# Patient Record
Sex: FEMALE | Race: BLACK OR AFRICAN AMERICAN | Employment: FULL TIME | URBAN - METROPOLITAN AREA
[De-identification: names, ages, dates, MRNs, and addresses within clinical notes are randomized per-mention and may not be internally consistent; named-entity substitution may affect disease eponyms.]

---
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
## 2025-05-24 ENCOUNTER — HOSPITAL ENCOUNTER (EMERGENCY)
Facility: HOSPITAL | Age: 20
Discharge: HOME | End: 2025-05-24
Attending: EMERGENCY MEDICINE
Payer: COMMERCIAL

## 2025-05-24 ENCOUNTER — APPOINTMENT (OUTPATIENT)
Facility: HOSPITAL | Age: 20
End: 2025-05-24
Payer: COMMERCIAL

## 2025-05-24 VITALS
BODY MASS INDEX: 41.02 KG/M2 | OXYGEN SATURATION: 99 % | HEART RATE: 70 BPM | SYSTOLIC BLOOD PRESSURE: 118 MMHG | HEIGHT: 71 IN | RESPIRATION RATE: 20 BRPM | WEIGHT: 293 LBS | DIASTOLIC BLOOD PRESSURE: 70 MMHG | TEMPERATURE: 98.2 F

## 2025-05-24 DIAGNOSIS — O03.9 COMPLETE MISCARRIAGE: Primary | ICD-10-CM

## 2025-05-24 LAB
ANION GAP SERPL CALC-SCNC: 5 MMOL/L (ref 2–12)
APPEARANCE UR: ABNORMAL
BACTERIA URNS QL MICRO: NEGATIVE /HPF
BASOPHILS # BLD: 0.08 K/UL (ref 0–0.1)
BASOPHILS NFR BLD: 1 % (ref 0–1)
BILIRUB UR QL: NEGATIVE
BUN SERPL-MCNC: 7 MG/DL (ref 6–20)
BUN/CREAT SERPL: 8 (ref 12–20)
CALCIUM SERPL-MCNC: 9.5 MG/DL (ref 8.5–10.1)
CHLORIDE SERPL-SCNC: 108 MMOL/L (ref 97–108)
CO2 SERPL-SCNC: 24 MMOL/L (ref 21–32)
COLOR UR: YELLOW
CREAT SERPL-MCNC: 0.85 MG/DL (ref 0.55–1.02)
DIFFERENTIAL METHOD BLD: NORMAL
EOSINOPHIL # BLD: 0.39 K/UL (ref 0–0.4)
EOSINOPHIL NFR BLD: 5.1 % (ref 0–7)
EPITH CASTS URNS QL MICRO: ABNORMAL /LPF
ERYTHROCYTE [DISTWIDTH] IN BLOOD BY AUTOMATED COUNT: 12.7 % (ref 11.5–14.5)
GLUCOSE SERPL-MCNC: 91 MG/DL (ref 65–100)
GLUCOSE UR STRIP.AUTO-MCNC: NEGATIVE MG/DL
HCG SERPL QL: NEGATIVE
HCG UR QL: NEGATIVE
HCT VFR BLD AUTO: 41.4 % (ref 35–47)
HGB BLD-MCNC: 13.6 G/DL (ref 11.5–16)
HGB UR QL STRIP: ABNORMAL
HYALINE CASTS URNS QL MICRO: ABNORMAL /LPF (ref 0–2)
IMM GRANULOCYTES # BLD AUTO: 0.03 K/UL (ref 0–0.04)
IMM GRANULOCYTES NFR BLD AUTO: 0.4 % (ref 0–0.5)
KETONES UR QL STRIP.AUTO: ABNORMAL MG/DL
LEUKOCYTE ESTERASE UR QL STRIP.AUTO: ABNORMAL
LYMPHOCYTES # BLD: 2.47 K/UL (ref 0.8–3.5)
LYMPHOCYTES NFR BLD: 32.4 % (ref 12–49)
MCH RBC QN AUTO: 28 PG (ref 26–34)
MCHC RBC AUTO-ENTMCNC: 32.9 G/DL (ref 30–36.5)
MCV RBC AUTO: 85.2 FL (ref 80–99)
MONOCYTES # BLD: 0.62 K/UL (ref 0–1)
MONOCYTES NFR BLD: 8.1 % (ref 5–13)
NEUTS SEG # BLD: 4.03 K/UL (ref 1.8–8)
NEUTS SEG NFR BLD: 53 % (ref 32–75)
NITRITE UR QL STRIP.AUTO: NEGATIVE
NRBC # BLD: 0 K/UL (ref 0–0.01)
NRBC BLD-RTO: 0 PER 100 WBC
PH UR STRIP: 5.5 (ref 5–8)
PLATELET # BLD AUTO: 277 K/UL (ref 150–400)
PMV BLD AUTO: 9.6 FL (ref 8.9–12.9)
POTASSIUM SERPL-SCNC: 3.5 MMOL/L (ref 3.5–5.1)
PROT UR STRIP-MCNC: 30 MG/DL
RBC # BLD AUTO: 4.86 M/UL (ref 3.8–5.2)
RBC #/AREA URNS HPF: >100 /HPF (ref 0–5)
SODIUM SERPL-SCNC: 137 MMOL/L (ref 136–145)
SP GR UR REFRACTOMETRY: 1.03
URINE CULTURE IF INDICATED: ABNORMAL
UROBILINOGEN UR QL STRIP.AUTO: 1 EU/DL (ref 0.2–1)
WBC # BLD AUTO: 7.6 K/UL (ref 3.6–11)
WBC URNS QL MICRO: ABNORMAL /HPF (ref 0–4)

## 2025-05-24 PROCEDURE — 85025 COMPLETE CBC W/AUTO DIFF WBC: CPT

## 2025-05-24 PROCEDURE — 81025 URINE PREGNANCY TEST: CPT

## 2025-05-24 PROCEDURE — 36415 COLL VENOUS BLD VENIPUNCTURE: CPT

## 2025-05-24 PROCEDURE — 84703 CHORIONIC GONADOTROPIN ASSAY: CPT

## 2025-05-24 PROCEDURE — 80048 BASIC METABOLIC PNL TOTAL CA: CPT

## 2025-05-24 PROCEDURE — 99284 EMERGENCY DEPT VISIT MOD MDM: CPT

## 2025-05-24 PROCEDURE — 81001 URINALYSIS AUTO W/SCOPE: CPT

## 2025-05-24 PROCEDURE — 76857 US EXAM PELVIC LIMITED: CPT

## 2025-05-24 ASSESSMENT — PAIN DESCRIPTION - DESCRIPTORS: DESCRIPTORS: CRAMPING

## 2025-05-24 ASSESSMENT — PAIN SCALES - GENERAL: PAINLEVEL_OUTOF10: 5

## 2025-05-24 ASSESSMENT — PAIN - FUNCTIONAL ASSESSMENT
PAIN_FUNCTIONAL_ASSESSMENT: 0-10
PAIN_FUNCTIONAL_ASSESSMENT: ACTIVITIES ARE NOT PREVENTED

## 2025-05-24 ASSESSMENT — PAIN DESCRIPTION - LOCATION: LOCATION: ABDOMEN

## 2025-05-24 ASSESSMENT — PAIN DESCRIPTION - ORIENTATION: ORIENTATION: LOWER

## 2025-05-24 NOTE — ED PROVIDER NOTES
[facility] EMERGENCY DEPARTMENT  EMERGENCY DEPARTMENT ENCOUNTER       Pt Name: Jeff Cottrell  MRN: [id number]  Birthdate 2005  Date of evaluation: 5/24/2025  Provider: Los Bermudez MD   PCP: No primary care provider on file.  Note Started: 5:13 PM 5/24/25     CHIEF COMPLAINT       Chief Complaint   Patient presents with    Vaginal Bleeding     Ambulatory with cc of vaginal bleeding x2 days that has progressively gotten worse this AM with BRB and clots. Pt states G1, had a positive pregnancy test last week and has not seen OB yet. Pt also endorses lower abdominal cramping associated with bleeding. LMP 4/10/25         HISTORY OF PRESENT ILLNESS: 1 or more elements      History From: patient, History limited by: No limitations     Jeff Cottrell is a 19 y.o. female without significant medical history who presents with chief complaint of vaginal bleeding, concern for miscarriage.  Patient LMP 4/10/2025.  No prior pregnancies.  She took a positive pregnancy test a week ago at home.  Over the past 2 to 3 days she has had vaginal bleeding, cramping in the suprapubic region as well as her low back and passing large clots.  She states is very unusual for her.       Nursing Notes were all reviewed and agreed with or any disagreements were addressed in the HPI.     REVIEW OF SYSTEMS        Positives and Pertinent negatives as per HPI.    PAST HISTORY     Past Medical History:  No past medical history on file.    Past Surgical History:  No past surgical history on file.    Family History:  No family history on file.    Social History:       Allergies:  No Known Allergies    CURRENT MEDICATIONS      There are no discharge medications for this patient.      SCREENINGS               No data recorded         PHYSICAL EXAM      ED Triage Vitals [05/24/25 0955]   BP Systolic BP Percentile Diastolic BP Percentile Temp Temp Source Pulse Respirations SpO2   134/82 -- -- 98.2 °F (36.8 °C) Oral 70 20 100 %      Height Weight  - Scale         1.803 m (5' 11\") (!) 154.4 kg (340 lb 6.2 oz)              Physical Exam  Vitals and nursing note reviewed.   Constitutional:       General: She is not in acute distress.     Appearance: Normal appearance. She is obese. She is not ill-appearing.   Cardiovascular:      Rate and Rhythm: Normal rate and regular rhythm.      Heart sounds: No murmur heard.  Pulmonary:      Effort: Pulmonary effort is normal. No respiratory distress.      Breath sounds: Normal breath sounds.   Abdominal:      General: Abdomen is flat. There is no distension.      Palpations: Abdomen is soft.      Tenderness: There is abdominal tenderness. There is guarding. There is no rebound.      Hernia: No hernia is present.   Genitourinary:     Comments: Deferred by patient  Neurological:      General: No focal deficit present.      Mental Status: She is alert and oriented to person, place, and time.          DIAGNOSTIC RESULTS   LABS:     Labs Reviewed   URINALYSIS WITH REFLEX TO CULTURE - Abnormal; Notable for the following components:       Result Value    Appearance CLOUDY (*)     Protein, UA 30 (*)     Ketones, Urine TRACE (*)     Blood, Urine LARGE (*)     Leukocyte Esterase, Urine SMALL (*)     RBC, UA >100 (*)     Epithelial Cells, UA MANY (*)     All other components within normal limits   BASIC METABOLIC PANEL - Abnormal; Notable for the following components:    BUN/Creatinine Ratio 8 (*)     All other components within normal limits   CBC WITH AUTO DIFFERENTIAL   HCG, SERUM, QUALITATIVE   POC PREGNANCY UR-QUAL          EKG: If performed, independent interpretation documented below in the MDM section     RADIOLOGY:  Non-plain film images such as CT, Ultrasound and MRI are read by the radiologist. Plain radiographic images are visualized and preliminarily interpreted by the ED Provider with the findings documented in the MDM section.     Interpretation per the Radiologist below, if available at the time of this note:

## 2025-05-24 NOTE — DISCHARGE INSTRUCTIONS
You were evaluated in the emergency department for miscarriage.  Your examination was reassuring as was your work-up including lab work, urinalysis, and ultrasound.  It will be important for you to follow-up with your OB/Gyn in 5-7 days.  If you develop worsening symptoms such as worsening bleeding or fevers, please return to the emergency department immediately.     Latest Reference Range & Units 05/24/25 10:05 05/24/25 10:08   Sodium 136 - 145 mmol/L  137   Potassium 3.5 - 5.1 mmol/L  3.5   Chloride 97 - 108 mmol/L  108   CARBON DIOXIDE 21 - 32 mmol/L  24   BUN,BUNPL 6 - 20 MG/DL  7   Creatinine 0.55 - 1.02 MG/DL  0.85   Bun/Cre 12 - 20    8 (L)   Anion Gap 2 - 12 mmol/L  5   Est, Glom Filt Rate >60 ml/min/1.73m2  >90   Glucose 65 - 100 mg/dL  91   Calcium 8.5 - 10.1 MG/DL  9.5   Preg, Serum NEG    Negative   pH, Urine 5.0 - 8.0   5.5    WBC 3.6 - 11.0 K/uL  7.6   RBC 3.80 - 5.20 M/uL  4.86   Hemoglobin Quant 11.5 - 16.0 g/dL  13.6   Hematocrit 35.0 - 47.0 %  41.4   MCV 80.0 - 99.0 FL  85.2   MCH 26.0 - 34.0 PG  28.0   MCHC 30.0 - 36.5 g/dL  32.9   MPV 8.9 - 12.9 FL  9.6   RDW 11.5 - 14.5 %  12.7   Platelet Count 150 - 400 K/uL  277   Neutrophils % 32.0 - 75.0 %  53.0   Lymphocyte % 12.0 - 49.0 %  32.4   Monocytes % 5.0 - 13.0 %  8.1   Eosinophils % 0.0 - 7.0 %  5.1   Basophils % 0.0 - 1.0 %  1.0   Neutrophils Absolute 1.80 - 8.00 K/UL  4.03   Lymphocytes Absolute 0.80 - 3.50 K/UL  2.47   Monocytes Absolute 0.00 - 1.00 K/UL  0.62   Eosinophils Absolute 0.00 - 0.40 K/UL  0.39   Basophils Absolute 0.00 - 0.10 K/UL  0.08   Differential Type -    AUTOMATED   Immature Granulocytes % 0.0 - 0.5 %  0.4   Nucleated Red Blood Cells 0  WBC  0.00 - 0.01 K/uL  0.0  0.00   Immature Granulocytes Absolute 0.00 - 0.04 K/UL  0.03   Urine Culture if Indicated CNI   CULTURE NOT INDICATED BY UA RESULT    Color, UA -   YELLOW    Glucose, Ur NEG mg/dL Negative    Bilirubin, Urine NEG   Negative    Ketones, Urine NEG mg/dL TRACE !